# Patient Record
Sex: FEMALE | Race: WHITE
[De-identification: names, ages, dates, MRNs, and addresses within clinical notes are randomized per-mention and may not be internally consistent; named-entity substitution may affect disease eponyms.]

---

## 2019-04-11 ENCOUNTER — HOSPITAL ENCOUNTER (EMERGENCY)
Dept: HOSPITAL 25 - UCCORT | Age: 19
Discharge: HOME | End: 2019-04-11
Payer: COMMERCIAL

## 2019-04-11 VITALS — DIASTOLIC BLOOD PRESSURE: 72 MMHG | SYSTOLIC BLOOD PRESSURE: 116 MMHG

## 2019-04-11 DIAGNOSIS — J06.9: Primary | ICD-10-CM

## 2019-04-11 DIAGNOSIS — H65.93: ICD-10-CM

## 2019-04-11 PROCEDURE — 99202 OFFICE O/P NEW SF 15 MIN: CPT

## 2019-04-11 PROCEDURE — G0463 HOSPITAL OUTPT CLINIC VISIT: HCPCS

## 2019-04-11 NOTE — UC
Throat Pain/Nasal Romeo HPI





- HPI Summary


HPI Summary: 


18-year-old female presents with 3 day history of fever, chills, malaise, nasal 

congestion, sinus pressure, sore throat, and a nonproductive cough.  States 

this morning she woke up with bilateral ear pain and fullness.  Denies tinnitus

, vertigo, dysphagia, chest pain, shortness of breath, abdominal pain, nausea, 

vomiting, diarrhea.








- History of Current Complaint


Chief Complaint: UCGeneralIllness


Stated Complaint: RIGHT EAR CONCERN


Time Seen by Provider: 04/11/19 12:40


Hx Obtained From: Patient


Hx Last Menstrual Period: 3/2019


Pain Intensity: 0





- Allergies/Home Medications


Allergies/Adverse Reactions: 


 Allergies











Allergy/AdvReac Type Severity Reaction Status Date / Time


 


No Known Allergies Allergy   Verified 04/11/19 12:30











Home Medications: 


 Home Medications





Ibuprofen TAB* [Advil TAB*] 400 mg PO Q6H PRN 04/11/19 [History Confirmed 04/11/ 19]











PMH/Surg Hx/FS Hx/Imm Hx


Previously Healthy: Yes - Denies significant PMH





- Surgical History


Surgical History: Yes


Surgery Procedure, Year, and Place: tonsillectomy





- Family History


Known Family History: Positive: Non-Contributory





- Social History


Occupation: Student


Lives: Dormitory/Roommates


Alcohol Use: Occasionally


Substance Use Type: Marijuana


Substance Use Comment - Amount & Last Used: occasional


Smoking Status (MU): Never Smoked Tobacco





Review of Systems


All Other Systems Reviewed And Are Negative: Yes


Constitutional: Positive: Fever, Chills, Fatigue


Skin: Negative: Rash


Eyes: Negative: Drainage, Eye Redness


ENT: Positive: Sore Throat, Ear Ache, Nasal Discharge, Sinus Congestion, Sinus 

Pain/Tenderness


Respiratory: Positive: Cough.  Negative: Shortness Of Breath


Cardiovascular: Negative: Palpitations, Chest Pain


Gastrointestinal: Negative: Abdominal Pain, Vomiting, Diarrhea, Nausea


Genitourinary: Positive: Negative


Musculoskeletal: Positive: Negative


Neurological: Positive: Negative


Is Patient Immunocompromised?: No





Physical Exam





- Summary


Physical Exam Summary: 


GENERAL APPEARANCE: Well developed, well nourished, alert and cooperative, and 

appears to be in no acute distress.





EYES: Conjunctiva clear. No drainage. 





EARS: Bilateral bulging TMs with erythema and effusion.





NOSE: Mild-moderate nasal congestion. No nasal discharge.





THROAT: Pharyngeal erythema. Tonsils surgically absent. Uvula midline. Oral 

cavity normal. Teeth and gingiva in good general condition.





NECK: Neck supple, non-tender without lymphadenopathy.





CARDIAC: Normal S1 and S2. No S3, S4 or murmurs. Rhythm is regular. There is no 

peripheral edema, cyanosis or pallor. Extremities are warm and well perfused. 

Capillary refill is less than 2 seconds. Peripheral pulses intact.





LUNGS: Clear to auscultation without rales, rhonchi, wheezing or diminished 

breath sounds. Dry, non-productive cough.





ABDOMEN: Positive bowel sounds. Soft, nondistended, nontender. No guarding or 

rebound. No masses or hepatosplenomegally.





MUSKULOSKELETAL: ROM intact to all extremities. No joint erythema or 

tenderness. Normal muscular development. Normal gait.





SKIN: Skin normal color, texture and turgor with no lesions or eruptions.





Triage Information Reviewed: Yes


Vital Signs: 


 Initial Vital Signs











Temp  97.7 F   04/11/19 12:29


 


Pulse  68   04/11/19 12:29


 


Resp  14   04/11/19 12:29


 


BP  116/72   04/11/19 12:29


 


Pulse Ox  99   04/11/19 12:29











Vital Signs Reviewed: Yes





Throat Pain/Nasal Course/Dx





- Course


Course Of Treatment: 


18-year-old female presents with 3 day history of fever, chills, malaise, nasal 

congestion, sinus pressure, sore throat, and a nonproductive cough.  States 

this morning she woke up with bilateral ear pain and fullness.  Denies tinnitus

, vertigo, dysphagia, chest pain, shortness of breath, abdominal pain, nausea, 

vomiting, diarrhea.  Afebrile.  Vital signs stable.  Exam was significant for 

mild to moderate nasal congestion, Or sinus tenderness, bilateral bulging TMs 

with erythema and effusion, mild pharyngeal erythema, and a dry nonproductive 

cough.  History and exam are consistent with a upper respiratory infection with 

bilateral otitis media.  We'll start her on Augmentin 875 mg twice a day 10 

days to treat for the infection as well as recommend symptomatic URI treatment.

  She is to return here or follow up at the [facility] in 7 days if 

symptoms do not improve.  Discharged guidance warning symptoms reviewed with 

patient.  Verbalizes understanding and agrees with plan of care.








- Differential Dx/Diagnosis


Differential Diagnosis/HQI/PQRI: Otitis Media, Sinusitis, URI


Provider Diagnosis: 


 URI (upper respiratory infection), Bilateral otitis media with effusion








Discharge





- Sign-Out/Discharge


Documenting (check all that apply): Patient Departure


All imaging exams completed and their final reports reviewed: No Studies





- Discharge Plan


Condition: Stable


Disposition: HOME


Prescriptions: 


Amoxicillin/Clavulanate TAB* [Augmentin *] 875 mg PO BID #20 tab


Fluticasone NASAL SPRAY 50MCG* [Flonase NASAL SPRAY 50MCG*] 2 spray BOTH NARES 

DAILY #1 btl


Patient Education Materials:  Ear Infection (ED), Upper Respiratory Infection (

ED)


Referrals: 


No Primary Care Phys,NOPCP [Primary Care Provider] - 


Additional Instructions: 


Your history and exam are consistent with an upper respiratory infection with 

ear infection in both ears. We will start you on an antibiotic to treat for the 

ear infection.





Start Augmentin 875 mg 1 tab twice a day for 10 days. Take with food to avoid 

upset stomach. Be sure to complete the entire course even if feeling better.





Drink plenty of fluids to avoid dehydration especially if you are running any 

fever.





Use a saline rinse kit such as Neti Pot or NeilMed at least twice a day to help 

thin secretions and promote drainage of the sinuses.





Use fluticasone (Flonase) nasal spray 2 sprays each nostril once daily.





Take over the counter acetaminophen (Tylenol) or ibuprofen (Advil, Motrin) 

according to directions as needed for pain or fever.





Use salt water gargles several times a day if you have a sore throat.





You may also use Chloraseptic spray or Cepacol lonzenges according to 

directions which contain a numbing medication and can provide some temporary 

relief from your sore throat.





Return here or follow up with [facility] in 7 days if symptoms persist.





Seek immediate medical attention in the emergency room if you have fever 

greater than 100.5 F despite taking acetaminophen or ibuprofen, have chest pain

, difficulty breathing, are unable to swallow, or have any worsening of 

symptoms.





- Billing Disposition and Condition


Condition: STABLE


Disposition: Home





- Attestation Statements


Provider Attestation: 





I was available for consult. This patient was seen by the [facility]. The patient was 

not presented to, seen by, or examined by me. -Christos